# Patient Record
Sex: FEMALE | Race: WHITE | NOT HISPANIC OR LATINO
[De-identification: names, ages, dates, MRNs, and addresses within clinical notes are randomized per-mention and may not be internally consistent; named-entity substitution may affect disease eponyms.]

---

## 2017-10-17 ENCOUNTER — RX RENEWAL (OUTPATIENT)
Age: 27
End: 2017-10-17

## 2017-11-22 ENCOUNTER — APPOINTMENT (OUTPATIENT)
Dept: OBGYN | Facility: CLINIC | Age: 27
End: 2017-11-22
Payer: COMMERCIAL

## 2017-11-22 VITALS
BODY MASS INDEX: 19.49 KG/M2 | SYSTOLIC BLOOD PRESSURE: 122 MMHG | HEIGHT: 63 IN | WEIGHT: 110 LBS | DIASTOLIC BLOOD PRESSURE: 81 MMHG

## 2017-11-22 PROCEDURE — 99395 PREV VISIT EST AGE 18-39: CPT

## 2017-11-22 RX ORDER — NORGESTIMATE AND ETHINYL ESTRADIOL 7DAYSX3 28
0.18/0.215/0.25 KIT ORAL DAILY
Qty: 90 | Refills: 3 | Status: ACTIVE | COMMUNITY
Start: 2017-11-22 | End: 1900-01-01

## 2017-11-23 LAB
C TRACH RRNA SPEC QL NAA+PROBE: NOT DETECTED
N GONORRHOEA RRNA SPEC QL NAA+PROBE: NOT DETECTED
SOURCE TP AMPLIFICATION: NORMAL

## 2017-11-27 LAB — CYTOLOGY CVX/VAG DOC THIN PREP: NORMAL

## 2018-01-08 ENCOUNTER — RX RENEWAL (OUTPATIENT)
Age: 28
End: 2018-01-08

## 2018-04-02 ENCOUNTER — RX RENEWAL (OUTPATIENT)
Age: 28
End: 2018-04-02

## 2018-12-20 ENCOUNTER — APPOINTMENT (OUTPATIENT)
Dept: OBGYN | Facility: CLINIC | Age: 28
End: 2018-12-20
Payer: COMMERCIAL

## 2018-12-20 VITALS
WEIGHT: 110 LBS | BODY MASS INDEX: 19.49 KG/M2 | HEIGHT: 63 IN | DIASTOLIC BLOOD PRESSURE: 73 MMHG | SYSTOLIC BLOOD PRESSURE: 116 MMHG

## 2018-12-20 PROCEDURE — 99395 PREV VISIT EST AGE 18-39: CPT

## 2018-12-24 LAB
C TRACH RRNA SPEC QL NAA+PROBE: NOT DETECTED
HBV SURFACE AG SER QL: NONREACTIVE
HCV AB SER QL: NONREACTIVE
HCV S/CO RATIO: 0.13 S/CO
HIV1+2 AB SPEC QL IA.RAPID: NONREACTIVE
N GONORRHOEA RRNA SPEC QL NAA+PROBE: NOT DETECTED
SOURCE TP AMPLIFICATION: NORMAL
T PALLIDUM AB SER QL IA: NEGATIVE

## 2018-12-27 LAB — CYTOLOGY CVX/VAG DOC THIN PREP: NORMAL

## 2019-01-09 ENCOUNTER — APPOINTMENT (OUTPATIENT)
Dept: OBGYN | Facility: CLINIC | Age: 29
End: 2019-01-09
Payer: COMMERCIAL

## 2019-01-09 DIAGNOSIS — R87.612 LOW GRADE SQUAMOUS INTRAEPITHELIAL LESION ON CYTOLOGIC SMEAR OF CERVIX (LGSIL): ICD-10-CM

## 2019-01-09 PROCEDURE — 57454 BX/CURETT OF CERVIX W/SCOPE: CPT

## 2019-01-09 NOTE — PROCEDURE
[Colposcopy] : colposcopy [LGSIL] : low grade squamous intraepithelial lesion [Patient] : patient [Parent] : parent [Infection] : infection [Bleeding] : bleeding [Pap Performed] : a cervical Pap smear was not performed [Punctation ___ o'clock] : no punctation [Normal Staining] : normal staining [No Abnormalities] : no abnormalities seen [Biopsies Taken: # ___] : [unfilled] biopsies taken of the cervix [Biopsy Locations ___ o'clock] : the biopsies were taken at [unfilled] o'clock [ECC Done] : Endocervical curettage was performed.  [Alivia's] : Alivia's solution [Tolerated Well] : the patient tolerated the procedure well [No Complications] : there were no complications

## 2019-01-28 LAB — CORE LAB BIOPSY: NORMAL

## 2019-02-11 ENCOUNTER — OTHER (OUTPATIENT)
Age: 29
End: 2019-02-11

## 2019-02-27 ENCOUNTER — APPOINTMENT (OUTPATIENT)
Dept: OBGYN | Facility: CLINIC | Age: 29
End: 2019-02-27
Payer: COMMERCIAL

## 2019-02-27 DIAGNOSIS — R87.613 HIGH GRADE SQUAMOUS INTRAEPITHELIAL LESION ON CYTOLOGIC SMEAR OF CERVIX (HGSIL): ICD-10-CM

## 2019-02-27 PROCEDURE — 57460 BX OF CERVIX W/SCOPE LEEP: CPT

## 2019-02-27 NOTE — PROCEDURE
[Colposcopy With Loop Electrode Excision Of The Cervix] : colposcopy with loop electrode excision of the cervix [Severe Dysplasia] : severe dysplasia of cervix [Risks] : risks [Benefits] : benefits [Alternatives] : alternatives [Infection] : infection [Bleeding] : bleeding [Allergic Reaction] : allergic reaction [Injury to Vagina] : injury to vagina [Injury to Cervix] : injury to cervix [Cervical Incompetence] : cervical incompetence [CONSENT OBTAINED] : written consent was obtained prior to the procedure. [No Premedication] : No premedication [Non-staining ___ o'clock] : no staining at [unfilled] o'clock [No Abnormalities] : no abnormalities seen [Cone Biopsy] : A cone biopsy was performed using a ~M loop electrode.  The endocervix was excised to a 1 cm depth. [Cutting Setting  ___ russell] : the cutting setting was [unfilled] russell [Overton Setting  ___ russell] : the bland setting was [unfilled] russell [ECC Done] : endocervical curettage was not performed. [Coagulation] : ball electrode using coagulation [Sent to Path] : the excised lesion was place in buffered formalin and sent for pathlogy [Tolerated Well] : the patient tolerated the procedure well [No Complications] : there were no complications [de-identified] : c

## 2019-03-06 LAB — CORE LAB BIOPSY: NORMAL

## 2019-03-07 ENCOUNTER — TRANSCRIPTION ENCOUNTER (OUTPATIENT)
Age: 29
End: 2019-03-07

## 2019-03-13 ENCOUNTER — APPOINTMENT (OUTPATIENT)
Dept: OBGYN | Facility: CLINIC | Age: 29
End: 2019-03-13
Payer: COMMERCIAL

## 2019-03-13 VITALS — SYSTOLIC BLOOD PRESSURE: 116 MMHG | DIASTOLIC BLOOD PRESSURE: 70 MMHG

## 2019-03-13 PROCEDURE — 99212 OFFICE O/P EST SF 10 MIN: CPT

## 2019-03-13 NOTE — HISTORY OF PRESENT ILLNESS
[Vaginal Bleeding] : no vaginal bleeding [Clean/Dry/Intact] : clean, dry and intact [Discharge] : absent of discharge [Normal] : the vagina was normal [Doing Well] : is doing well [None] : None [de-identified] : s/p MELISA

## 2019-04-11 ENCOUNTER — APPOINTMENT (OUTPATIENT)
Dept: OBGYN | Facility: CLINIC | Age: 29
End: 2019-04-11
Payer: COMMERCIAL

## 2019-04-11 PROCEDURE — 90651 9VHPV VACCINE 2/3 DOSE IM: CPT

## 2019-04-11 PROCEDURE — 90471 IMMUNIZATION ADMIN: CPT

## 2019-06-17 ENCOUNTER — APPOINTMENT (OUTPATIENT)
Dept: OBGYN | Facility: CLINIC | Age: 29
End: 2019-06-17
Payer: COMMERCIAL

## 2019-06-17 PROCEDURE — 90715 TDAP VACCINE 7 YRS/> IM: CPT

## 2019-06-17 PROCEDURE — 90471 IMMUNIZATION ADMIN: CPT

## 2019-10-07 ENCOUNTER — APPOINTMENT (OUTPATIENT)
Dept: OBGYN | Facility: CLINIC | Age: 29
End: 2019-10-07

## 2019-10-18 ENCOUNTER — APPOINTMENT (OUTPATIENT)
Dept: OBGYN | Facility: CLINIC | Age: 29
End: 2019-10-18
Payer: COMMERCIAL

## 2019-10-18 PROCEDURE — 90651 9VHPV VACCINE 2/3 DOSE IM: CPT

## 2019-10-18 PROCEDURE — 90471 IMMUNIZATION ADMIN: CPT

## 2019-12-24 ENCOUNTER — RX RENEWAL (OUTPATIENT)
Age: 29
End: 2019-12-24

## 2020-01-24 RX ORDER — NORETHINDRONE ACETATE/ETHINYL ESTRADIOL AND FERROUS FUMARATE 1MG-20(21)
1-20 KIT ORAL
Qty: 28 | Refills: 0 | Status: ACTIVE | COMMUNITY
Start: 2018-12-20

## 2020-01-30 ENCOUNTER — APPOINTMENT (OUTPATIENT)
Dept: OBGYN | Facility: CLINIC | Age: 30
End: 2020-01-30
Payer: COMMERCIAL

## 2020-01-30 VITALS
WEIGHT: 110 LBS | BODY MASS INDEX: 19.49 KG/M2 | DIASTOLIC BLOOD PRESSURE: 75 MMHG | SYSTOLIC BLOOD PRESSURE: 116 MMHG | HEIGHT: 63 IN

## 2020-01-30 PROCEDURE — 99395 PREV VISIT EST AGE 18-39: CPT

## 2020-01-30 RX ORDER — NORETHINDRONE ACETATE/ETHINYL ESTRADIOL AND FERROUS FUMARATE 1MG-20(21)
1-20 KIT ORAL
Qty: 90 | Refills: 3 | Status: ACTIVE | COMMUNITY
Start: 2020-01-30 | End: 1900-01-01

## 2020-01-30 NOTE — HISTORY OF PRESENT ILLNESS
[Good] : being in good health [Healthy Diet] : a healthy diet [1 Year Ago] : 1 year ago [Regular Exercise] : regular exercise [Colposcopy Last Year] : colposcopy last year [Pap Smear Last Year] : Papanicolaou cytology last year [Weight Concerns] : no concerns with her weight [Menstrual Problems] : reports normal menses [Up to Date] : up to date with ~his/her~ STD screening [Sexually Active] : is sexually active

## 2020-02-03 LAB — HPV HIGH+LOW RISK DNA PNL CVX: NOT DETECTED

## 2020-02-06 LAB — CYTOLOGY CVX/VAG DOC THIN PREP: NORMAL

## 2022-02-01 ENCOUNTER — NON-APPOINTMENT (OUTPATIENT)
Age: 32
End: 2022-02-01

## 2022-02-23 ENCOUNTER — APPOINTMENT (OUTPATIENT)
Dept: OBGYN | Facility: CLINIC | Age: 32
End: 2022-02-23
Payer: COMMERCIAL

## 2022-02-23 VITALS
SYSTOLIC BLOOD PRESSURE: 106 MMHG | WEIGHT: 107 LBS | DIASTOLIC BLOOD PRESSURE: 71 MMHG | HEIGHT: 63 IN | BODY MASS INDEX: 18.96 KG/M2

## 2022-02-23 DIAGNOSIS — Z01.419 ENCOUNTER FOR GYNECOLOGICAL EXAMINATION (GENERAL) (ROUTINE) W/OUT ABNORMAL FINDINGS: ICD-10-CM

## 2022-02-23 PROCEDURE — 99395 PREV VISIT EST AGE 18-39: CPT

## 2022-03-02 LAB
CYTOLOGY CVX/VAG DOC THIN PREP: NORMAL
HPV HIGH+LOW RISK DNA PNL CVX: NOT DETECTED